# Patient Record
Sex: MALE | Race: WHITE | Employment: FULL TIME | URBAN - METROPOLITAN AREA
[De-identification: names, ages, dates, MRNs, and addresses within clinical notes are randomized per-mention and may not be internally consistent; named-entity substitution may affect disease eponyms.]

---

## 2018-07-08 ENCOUNTER — HOSPITAL ENCOUNTER (EMERGENCY)
Age: 63
Discharge: HOME | End: 2018-07-08
Attending: EMERGENCY MEDICINE
Payer: COMMERCIAL

## 2018-07-08 VITALS
WEIGHT: 189 LBS | TEMPERATURE: 98 F | BODY MASS INDEX: 25.05 KG/M2 | RESPIRATION RATE: 16 BRPM | HEART RATE: 81 BPM | DIASTOLIC BLOOD PRESSURE: 68 MMHG | OXYGEN SATURATION: 96 % | SYSTOLIC BLOOD PRESSURE: 134 MMHG | HEIGHT: 73 IN

## 2018-07-08 DIAGNOSIS — L72.3 SEBACEOUS CYST: Primary | ICD-10-CM

## 2018-07-08 PROCEDURE — 99282 EMERGENCY DEPT VISIT SF MDM: CPT

## 2018-07-08 NOTE — ED INITIAL ASSESSMENT (HPI)
Pt c/o \"a bump to my elbow that has been there for a month\". A cyst-like noted to right elbow, soft, palpable, and non-fixed. Denies pain. No redness noted. Denies injury.

## 2018-07-08 NOTE — ED PROVIDER NOTES
Patient Seen in: [facility] Emergency Department In [place]    History   Patient presents with:  Cyst    Stated Complaint: \"cyst to right elbow\"    HPI    29-year-old male presents emergency room for evaluation of \"cyst to right elbow \".   Patient stat 4011  ------------------------------------------------------------      MDM     Patient presents with cystic lesion to the right elbow, there is no evidence of any infection.   Patient was referred to orthopedics for removal.  Patient to return if any marie

## 2018-08-10 PROCEDURE — 88304 TISSUE EXAM BY PATHOLOGIST: CPT | Performed by: ORTHOPAEDIC SURGERY

## 2018-08-21 PROBLEM — R22.31 ELBOW MASS, RIGHT: Status: ACTIVE | Noted: 2018-08-21

## (undated) NOTE — ED AVS SNAPSHOT
Mario Saenz   MRN: [id number]    Department:  THE [facility] Emergency Department in [place]   Date of Visit:  7/8/2018           Disclosure     Insurance plans vary and the physician(s) referred by the ER may not be covered by your plan.  Please conta tell this physician (or your personal doctor if your instructions are to return to your personal doctor) about any new or lasting problems. The primary care or specialist physician will see patients referred from the [facility] Emergency Department.  Salvadore Skiff